# Patient Record
Sex: FEMALE | Race: WHITE | NOT HISPANIC OR LATINO | Employment: OTHER | URBAN - METROPOLITAN AREA
[De-identification: names, ages, dates, MRNs, and addresses within clinical notes are randomized per-mention and may not be internally consistent; named-entity substitution may affect disease eponyms.]

---

## 2021-12-27 ENCOUNTER — HOSPITAL ENCOUNTER (OUTPATIENT)
Dept: TELEMEDICINE | Facility: OTHER | Age: 71
Discharge: HOME | End: 2021-12-27

## 2021-12-27 DIAGNOSIS — R41.82 ALTERED MENTAL STATUS, UNSPECIFIED ALTERED MENTAL STATUS TYPE: ICD-10-CM

## 2021-12-27 PROCEDURE — G0427 INPT/ED TELECONSULT70: HCPCS | Mod: GT,,, | Performed by: NURSE PRACTITIONER

## 2021-12-27 PROCEDURE — G0427 PR INPT TELEHEALTH CON 70/>M: ICD-10-PCS | Mod: GT,,, | Performed by: NURSE PRACTITIONER

## 2022-05-10 PROBLEM — H33.002 RHEGMATOGENOUS RETINAL DETACHMENT OF LEFT EYE: Status: ACTIVE | Noted: 2022-05-10

## 2022-11-17 PROBLEM — I67.1 INTRACRANIAL ANEURYSM: Status: ACTIVE | Noted: 2022-11-17

## 2023-01-16 PROBLEM — D64.9 ANEMIA: Status: ACTIVE | Noted: 2023-01-16

## 2023-01-16 PROBLEM — I45.10 RBBB: Status: ACTIVE | Noted: 2023-01-16

## 2023-01-16 PROBLEM — F30.9 MANIA: Status: ACTIVE | Noted: 2023-01-16

## 2023-01-16 PROBLEM — R07.9 CHEST PAIN: Status: ACTIVE | Noted: 2023-01-16

## 2023-01-16 PROBLEM — Z00.8 MEDICAL CLEARANCE FOR PSYCHIATRIC ADMISSION: Status: ACTIVE | Noted: 2023-01-16

## 2023-01-16 PROBLEM — E87.6 HYPOKALEMIA: Status: ACTIVE | Noted: 2023-01-16

## 2023-01-16 PROBLEM — F32.A DEPRESSION: Status: ACTIVE | Noted: 2023-01-16

## 2023-01-16 PROBLEM — R55 SYNCOPE: Status: ACTIVE | Noted: 2023-01-16

## 2023-01-16 PROBLEM — I10 BENIGN HYPERTENSION: Status: ACTIVE | Noted: 2023-01-16

## 2023-01-17 PROBLEM — R94.31 PROLONGED Q-T INTERVAL ON ECG: Status: ACTIVE | Noted: 2023-01-17

## 2023-01-18 PROBLEM — E87.6 HYPOKALEMIA: Status: RESOLVED | Noted: 2023-01-16 | Resolved: 2023-01-18

## 2023-01-19 ENCOUNTER — PATIENT OUTREACH (OUTPATIENT)
Dept: ADMINISTRATIVE | Facility: CLINIC | Age: 73
End: 2023-01-19

## 2023-01-19 NOTE — PROGRESS NOTES
C3 nurse attempted to contact Inessa Johnson for a TCC post hospital discharge follow up call. No answer. The patient does not have a scheduled HOSFU appointment, and the pt does not have an [facility] PCP.

## 2023-01-30 PROBLEM — F31.9 BIPOLAR DEPRESSION: Status: ACTIVE | Noted: 2023-01-30

## 2023-01-31 PROBLEM — F42.3: Status: ACTIVE | Noted: 2023-01-31

## 2023-01-31 PROBLEM — N17.9 AKI (ACUTE KIDNEY INJURY): Status: ACTIVE | Noted: 2023-01-31

## 2023-01-31 PROBLEM — R45.851 SUICIDAL IDEATION: Status: ACTIVE | Noted: 2023-01-31

## 2023-01-31 PROBLEM — E11.9 TYPE 2 DIABETES MELLITUS, WITHOUT LONG-TERM CURRENT USE OF INSULIN: Status: ACTIVE | Noted: 2023-01-31

## 2023-02-02 ENCOUNTER — PATIENT OUTREACH (OUTPATIENT)
Dept: ADMINISTRATIVE | Facility: CLINIC | Age: 73
End: 2023-02-02

## 2023-02-02 NOTE — PROGRESS NOTES
2nd Attempt made to reach patient for TCC call. Left voicemail please call [phone] leave first name, last name, and  [place] will return your call.

## 2023-02-02 NOTE — PROGRESS NOTES
C3 nurse attempted to contact Inessa Johnson  for a TCC post hospital discharge follow up call. The patient is unable to conduct the call @ this time. The patient requested a callback.    The patient does not have a scheduled HOSFU appointment within 5-7 days post hospital discharge date 02/01/23. Message sent to Physician staff to assist with HOSFU appointment scheduling.      [facility] pcp, unable to route to pcp/staff

## 2023-04-17 PROBLEM — Z00.8 MEDICAL CLEARANCE FOR PSYCHIATRIC ADMISSION: Status: RESOLVED | Noted: 2023-01-16 | Resolved: 2023-04-17

## 2023-05-08 PROBLEM — N17.9 AKI (ACUTE KIDNEY INJURY): Status: RESOLVED | Noted: 2023-01-31 | Resolved: 2023-05-08
